# Patient Record
Sex: FEMALE | Race: WHITE | NOT HISPANIC OR LATINO | URBAN - METROPOLITAN AREA
[De-identification: names, ages, dates, MRNs, and addresses within clinical notes are randomized per-mention and may not be internally consistent; named-entity substitution may affect disease eponyms.]

---

## 2021-02-06 ENCOUNTER — EMERGENCY (EMERGENCY)
Facility: HOSPITAL | Age: 31
LOS: 0 days | Discharge: HOME | End: 2021-02-06
Attending: STUDENT IN AN ORGANIZED HEALTH CARE EDUCATION/TRAINING PROGRAM | Admitting: STUDENT IN AN ORGANIZED HEALTH CARE EDUCATION/TRAINING PROGRAM
Payer: OTHER MISCELLANEOUS

## 2021-02-06 VITALS
OXYGEN SATURATION: 100 % | DIASTOLIC BLOOD PRESSURE: 84 MMHG | RESPIRATION RATE: 18 BRPM | SYSTOLIC BLOOD PRESSURE: 133 MMHG | HEART RATE: 84 BPM | TEMPERATURE: 98 F
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
## 2021-02-06 DIAGNOSIS — M25.579 PAIN IN UNSPECIFIED ANKLE AND JOINTS OF UNSPECIFIED FOOT: ICD-10-CM
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
## 2021-02-06 DIAGNOSIS — S82.401A UNSPECIFIED FRACTURE OF SHAFT OF RIGHT FIBULA, INITIAL ENCOUNTER FOR CLOSED FRACTURE: ICD-10-CM

## 2021-02-06 PROCEDURE — 73590 X-RAY EXAM OF LOWER LEG: CPT | Mod: 26,RT

## 2021-02-06 PROCEDURE — 29515 APPLICATION SHORT LEG SPLINT: CPT

## 2021-02-06 PROCEDURE — 99284 EMERGENCY DEPT VISIT MOD MDM: CPT | Mod: 25

## 2021-02-06 PROCEDURE — 73610 X-RAY EXAM OF ANKLE: CPT | Mod: 26,RT

## 2021-02-06 RX ORDER — ACETAMINOPHEN 500 MG
975 TABLET ORAL ONCE
Refills: 0 | Status: COMPLETED | OUTPATIENT
Start: 2021-02-06 | End: 2021-02-06

## 2021-02-06 RX ORDER — IBUPROFEN 200 MG
600 TABLET ORAL ONCE
Refills: 0 | Status: COMPLETED | OUTPATIENT
Start: 2021-02-06 | End: 2021-02-06

## 2021-02-06 RX ADMIN — Medication 600 MILLIGRAM(S): at 08:44

## 2021-02-06 RX ADMIN — Medication 975 MILLIGRAM(S): at 08:44

## 2021-02-06 NOTE — ED PROVIDER NOTE - CLINICAL SUMMARY MEDICAL DECISION MAKING FREE TEXT BOX
Pt w/ ankle pain. + distal fibular frx on xray. Splint applied. crutches given. Discussed all available results.  Pt understands results, plan of care, outpt follow up w/ ortho as discussed, and signs and symptoms for ED return.  Pt  is comfortable with discharge. dc home.

## 2021-02-06 NOTE — ED ADULT TRIAGE NOTE - PATIENT ON (OXYGEN DELIVERY METHOD)
room air
[facility]  Podiatry  .  [place]   Phone: [phone]  Fax:   Follow Up Time:

## 2021-02-06 NOTE — ED PROVIDER NOTE - NS ED ROS FT
Constitutional:  No fever  ENMT: No neck pain or stiffness  Cardiac:  No chest pain  Respiratory:  No cough or respiratory distress.   GI:  No abdominal pain.  MS:  See HPI  Neuro:  No headache   Skin:  No skin rash  Except as documented in the HPI,  all other systems are negative

## 2021-02-06 NOTE — ED PROVIDER NOTE - PATIENT PORTAL LINK FT
You can access the FollowMyHealth Patient Portal offered by [facility] by registering at the following website: [email or web address]. By joining [employer]’s FollowMyHealth portal, you will also be able to view your health information using other applications (apps) compatible with our system.

## 2021-02-06 NOTE — ED ADULT NURSE NOTE - OBJECTIVE STATEMENT
patient alert and oriented x4. patient states she fell at work today and twisted ankle. difficulty baring weight on RLE. visible swelling in right ankle. pt able to move foot and toes, + pedal pulses b/l.

## 2021-02-06 NOTE — ED PROVIDER NOTE - CARE PROVIDER_API CALL
Reji Fields (MD)  Orthopaedic Surgery  [address] [place] [address]  Phone: [phone]  Fax: [phone]  Follow Up Time:

## 2021-02-06 NOTE — ED PROVIDER NOTE - WR ORDER STATUS 1
Telephoned results of lab work and advised to restart Atorvastatin 20 mg (half dose) daily.  Patient repeated back instructions  And advised to keep scheduled appointment in August for repeat labs.   Performed Resulted

## 2021-02-06 NOTE — ED PROVIDER NOTE - OBJECTIVE STATEMENT
29 y/o F no sig pmh p/w acute onset, non radiating, sharp right ankle pain s/p accidental inversion injury when stepping down 1 step today. + difficulty walking. No weakness or numbness

## 2021-02-06 NOTE — ED PROVIDER NOTE - NSFOLLOWUPCLINICS_GEN_ALL_ED_FT
[facility]  Orthpedic  [address], [place]   Phone: [phone]  Fax:   Follow Up Time:

## 2021-02-06 NOTE — ED ADULT NURSE NOTE - NSIMPLEMENTINTERV_GEN_ALL_ED
Implemented All Fall Risk Interventions:  [place] to call system. Call bell, personal items and telephone within reach. Instruct patient to call for assistance. Room bathroom lighting operational. Non-slip footwear when patient is off stretcher. Physically safe environment: no spills, clutter or unnecessary equipment. Stretcher in lowest position, wheels locked, appropriate side rails in place. Provide visual cue, wrist band, yellow gown, etc. Monitor gait and stability. Monitor for mental status changes and reorient to person, place, and time. Review medications for side effects contributing to fall risk. Reinforce activity limits and safety measures with patient and family.

## 2021-02-06 NOTE — ED PROVIDER NOTE - PHYSICAL EXAMINATION
CONSTITUTIONAL: NAD  SKIN: Warm dry  HEAD: NCAT  EYES: NL inspection  ENT: MMM  NECK: Supple  RESP: No resp distress  EXT: R ankle + swelling and ttp posterior Lat mal; no erythema; Med mal; prox fib; 5th metatarsal and rest of foot non ttp; pedal pulse 2+ b/l  NEURO: NL sensation and motor  PSYCH: Cooperative, appropriate.

## 2021-02-10 ENCOUNTER — APPOINTMENT (OUTPATIENT)
Dept: ORTHOPEDIC SURGERY | Facility: CLINIC | Age: 31
End: 2021-02-10
Payer: OTHER MISCELLANEOUS

## 2021-02-10 VITALS — WEIGHT: 185 LBS | RESPIRATION RATE: 16 BRPM | BODY MASS INDEX: 30.82 KG/M2 | HEIGHT: 65 IN

## 2021-02-10 PROCEDURE — 99204 OFFICE O/P NEW MOD 45 MIN: CPT

## 2021-02-10 PROCEDURE — 99072 ADDL SUPL MATRL&STAF TM PHE: CPT

## 2021-02-16 ENCOUNTER — LABORATORY RESULT (OUTPATIENT)
Age: 31
End: 2021-02-16

## 2021-02-17 ENCOUNTER — TRANSCRIPTION ENCOUNTER (OUTPATIENT)
Age: 31
End: 2021-02-17

## 2021-02-18 ENCOUNTER — APPOINTMENT (OUTPATIENT)
Dept: ORTHOPEDIC SURGERY | Facility: AMBULATORY SURGERY CENTER | Age: 31
End: 2021-02-18

## 2021-02-18 ENCOUNTER — OUTPATIENT (OUTPATIENT)
Dept: OUTPATIENT SERVICES | Facility: HOSPITAL | Age: 31
LOS: 1 days | Discharge: HOME | End: 2021-02-18
Payer: OTHER MISCELLANEOUS

## 2021-02-18 PROCEDURE — 29897 ANKLE ARTHROSCOPY/SURGERY: CPT | Mod: RT

## 2021-02-18 PROCEDURE — 27792 TREATMENT OF ANKLE FRACTURE: CPT | Mod: RT

## 2021-02-18 PROCEDURE — 27829 TREAT LOWER LEG JOINT: CPT | Mod: RT

## 2021-02-18 PROCEDURE — 77071 MNL APPL STRS JT RADIOGRAPHY: CPT

## 2021-02-19 ENCOUNTER — NON-APPOINTMENT (OUTPATIENT)
Age: 31
End: 2021-02-19

## 2021-03-03 ENCOUNTER — APPOINTMENT (OUTPATIENT)
Dept: ORTHOPEDIC SURGERY | Facility: CLINIC | Age: 31
End: 2021-03-03
Payer: OTHER MISCELLANEOUS

## 2021-03-03 VITALS — HEIGHT: 65 IN | BODY MASS INDEX: 30.82 KG/M2 | RESPIRATION RATE: 16 BRPM | WEIGHT: 185 LBS

## 2021-03-03 PROCEDURE — 99024 POSTOP FOLLOW-UP VISIT: CPT

## 2021-03-08 ENCOUNTER — RX RENEWAL (OUTPATIENT)
Age: 31
End: 2021-03-08

## 2021-03-31 ENCOUNTER — OUTPATIENT (OUTPATIENT)
Dept: OUTPATIENT SERVICES | Facility: HOSPITAL | Age: 31
LOS: 1 days | End: 2021-03-31
Payer: OTHER MISCELLANEOUS

## 2021-03-31 ENCOUNTER — APPOINTMENT (OUTPATIENT)
Dept: RADIOLOGY | Facility: CLINIC | Age: 31
End: 2021-03-31

## 2021-03-31 ENCOUNTER — APPOINTMENT (OUTPATIENT)
Dept: ORTHOPEDIC SURGERY | Facility: CLINIC | Age: 31
End: 2021-03-31
Payer: OTHER MISCELLANEOUS

## 2021-03-31 ENCOUNTER — RESULT REVIEW (OUTPATIENT)
Age: 31
End: 2021-03-31

## 2021-03-31 VITALS — RESPIRATION RATE: 16 BRPM | WEIGHT: 185 LBS | HEIGHT: 65 IN | BODY MASS INDEX: 30.82 KG/M2

## 2021-03-31 PROCEDURE — 73610 X-RAY EXAM OF ANKLE: CPT

## 2021-03-31 PROCEDURE — 73610 X-RAY EXAM OF ANKLE: CPT | Mod: 26,RT

## 2021-03-31 PROCEDURE — 99024 POSTOP FOLLOW-UP VISIT: CPT

## 2021-03-31 RX ORDER — IBUPROFEN 200 MG
600 CAPSULE ORAL DAILY
Qty: 60 | Refills: 2 | Status: DISCONTINUED | COMMUNITY
Start: 2021-02-15 | End: 2021-03-31

## 2021-03-31 RX ORDER — SPIRONOLACTONE 50 MG/1
TABLET ORAL
Refills: 0 | Status: ACTIVE | COMMUNITY

## 2021-03-31 RX ORDER — GABAPENTIN 300 MG/1
300 CAPSULE ORAL DAILY
Qty: 90 | Refills: 1 | Status: DISCONTINUED | COMMUNITY
Start: 2021-02-10 | End: 2021-03-31

## 2021-03-31 RX ORDER — ACETAMINOPHEN AND CODEINE 300; 30 MG/1; MG/1
300-30 TABLET ORAL
Qty: 40 | Refills: 0 | Status: DISCONTINUED | COMMUNITY
Start: 2021-02-10 | End: 2021-03-31

## 2021-03-31 RX ORDER — ASPIRIN 325 MG/1
325 TABLET, FILM COATED ORAL
Qty: 60 | Refills: 2 | Status: DISCONTINUED | COMMUNITY
Start: 2021-02-15 | End: 2021-03-31

## 2021-03-31 RX ORDER — OXYCODONE 5 MG/1
5 TABLET ORAL
Qty: 40 | Refills: 0 | Status: DISCONTINUED | COMMUNITY
Start: 2021-02-15 | End: 2021-03-31

## 2021-05-12 ENCOUNTER — RESULT REVIEW (OUTPATIENT)
Age: 31
End: 2021-05-12

## 2021-05-12 ENCOUNTER — OUTPATIENT (OUTPATIENT)
Dept: OUTPATIENT SERVICES | Facility: HOSPITAL | Age: 31
LOS: 1 days | End: 2021-05-12
Payer: OTHER MISCELLANEOUS

## 2021-05-12 ENCOUNTER — APPOINTMENT (OUTPATIENT)
Dept: ORTHOPEDIC SURGERY | Facility: CLINIC | Age: 31
End: 2021-05-12
Payer: OTHER MISCELLANEOUS

## 2021-05-12 ENCOUNTER — APPOINTMENT (OUTPATIENT)
Dept: RADIOLOGY | Facility: CLINIC | Age: 31
End: 2021-05-12

## 2021-05-12 VITALS — HEIGHT: 65 IN | RESPIRATION RATE: 16 BRPM | BODY MASS INDEX: 30.82 KG/M2 | WEIGHT: 185 LBS

## 2021-05-12 PROCEDURE — 99213 OFFICE O/P EST LOW 20 MIN: CPT | Mod: 24

## 2021-05-12 PROCEDURE — 73610 X-RAY EXAM OF ANKLE: CPT | Mod: 26,RT

## 2021-05-12 PROCEDURE — 99072 ADDL SUPL MATRL&STAF TM PHE: CPT

## 2021-05-20 ENCOUNTER — NON-APPOINTMENT (OUTPATIENT)
Age: 31
End: 2021-05-20

## 2021-05-20 ENCOUNTER — FORM ENCOUNTER (OUTPATIENT)
Age: 31
End: 2021-05-20

## 2021-05-25 ENCOUNTER — FORM ENCOUNTER (OUTPATIENT)
Age: 31
End: 2021-05-25

## 2021-06-23 ENCOUNTER — RESULT REVIEW (OUTPATIENT)
Age: 31
End: 2021-06-23

## 2021-06-23 ENCOUNTER — OUTPATIENT (OUTPATIENT)
Dept: OUTPATIENT SERVICES | Facility: HOSPITAL | Age: 31
LOS: 1 days | End: 2021-06-23
Payer: OTHER MISCELLANEOUS

## 2021-06-23 ENCOUNTER — APPOINTMENT (OUTPATIENT)
Dept: RADIOLOGY | Facility: CLINIC | Age: 31
End: 2021-06-23

## 2021-06-23 ENCOUNTER — APPOINTMENT (OUTPATIENT)
Dept: ORTHOPEDIC SURGERY | Facility: CLINIC | Age: 31
End: 2021-06-23
Payer: OTHER MISCELLANEOUS

## 2021-06-23 VITALS — RESPIRATION RATE: 16 BRPM | HEIGHT: 65 IN | BODY MASS INDEX: 30.82 KG/M2 | WEIGHT: 185 LBS

## 2021-06-23 DIAGNOSIS — M79.671 PAIN IN RIGHT FOOT: ICD-10-CM

## 2021-06-23 DIAGNOSIS — G57.91 UNSPECIFIED MONONEUROPATHY OF RIGHT LOWER LIMB: ICD-10-CM

## 2021-06-23 PROCEDURE — 99072 ADDL SUPL MATRL&STAF TM PHE: CPT

## 2021-06-23 PROCEDURE — 99214 OFFICE O/P EST MOD 30 MIN: CPT

## 2021-06-23 PROCEDURE — 73610 X-RAY EXAM OF ANKLE: CPT | Mod: 26,RT

## 2021-06-23 RX ORDER — DOCUSATE SODIUM 100 MG/1
100 CAPSULE ORAL TWICE DAILY
Qty: 28 | Refills: 3 | Status: DISCONTINUED | COMMUNITY
Start: 2021-02-15 | End: 2021-06-23

## 2021-08-02 ENCOUNTER — RX RENEWAL (OUTPATIENT)
Age: 31
End: 2021-08-02

## 2021-09-10 ENCOUNTER — APPOINTMENT (OUTPATIENT)
Dept: ORTHOPEDIC SURGERY | Facility: CLINIC | Age: 31
End: 2021-09-10
Payer: OTHER MISCELLANEOUS

## 2021-09-10 PROCEDURE — 99213 OFFICE O/P EST LOW 20 MIN: CPT

## 2021-09-10 PROCEDURE — 73610 X-RAY EXAM OF ANKLE: CPT | Mod: RT

## 2021-09-10 PROCEDURE — 99072 ADDL SUPL MATRL&STAF TM PHE: CPT

## 2021-09-20 VITALS — HEIGHT: 65 IN | WEIGHT: 185 LBS | BODY MASS INDEX: 30.82 KG/M2 | RESPIRATION RATE: 16 BRPM

## 2022-01-12 NOTE — ED PROCEDURE NOTE - PROCEDURE SUPERVISED BY
Christopher Show Applicator Variable?: Yes Consent: The patient's consent was obtained including but not limited to risks of crusting, scabbing, blistering, scarring, darker or lighter pigmentary change, recurrence, incomplete removal and infection. Render Post-Care Instructions In Note?: no Duration Of Freeze Thaw-Cycle (Seconds): 0 Post-Care Instructions: I reviewed with the patient in detail post-care instructions. Patient is to wear sunprotection, and avoid picking at any of the treated lesions. Pt may apply Vaseline to crusted or scabbing areas. Detail Level: Detailed Medical Necessity Information: It is in your best interest to select a reason for this procedure from the list below. All of these items fulfill various CMS LCD requirements except the new and changing color options. Medical Necessity Clause: This procedure was medically necessary because the lesions that were treated were: Spray Paint Text: The liquid nitrogen was applied to the skin utilizing a spray paint frosting technique.

## 2022-01-18 ENCOUNTER — NON-APPOINTMENT (OUTPATIENT)
Age: 32
End: 2022-01-18

## 2022-03-11 ENCOUNTER — APPOINTMENT (OUTPATIENT)
Dept: ORTHOPEDIC SURGERY | Facility: CLINIC | Age: 32
End: 2022-03-11
Payer: OTHER MISCELLANEOUS

## 2022-03-11 VITALS — WEIGHT: 185 LBS | HEIGHT: 65 IN | BODY MASS INDEX: 30.82 KG/M2

## 2022-03-11 DIAGNOSIS — M25.571 PAIN IN RIGHT ANKLE AND JOINTS OF RIGHT FOOT: ICD-10-CM

## 2022-03-11 DIAGNOSIS — S93.431A SPRAIN OF TIBIOFIBULAR LIGAMENT OF RIGHT ANKLE, INITIAL ENCOUNTER: ICD-10-CM

## 2022-03-11 DIAGNOSIS — S82.831A OTHER FRACTURE OF UPPER AND LOWER END OF RIGHT FIBULA, INITIAL ENCOUNTER FOR CLOSED FRACTURE: ICD-10-CM

## 2022-03-11 DIAGNOSIS — M76.821 POSTERIOR TIBIAL TENDINITIS, RIGHT LEG: ICD-10-CM

## 2022-03-11 DIAGNOSIS — M72.2 PLANTAR FASCIAL FIBROMATOSIS: ICD-10-CM

## 2022-03-11 PROCEDURE — 99072 ADDL SUPL MATRL&STAF TM PHE: CPT

## 2022-03-11 PROCEDURE — 99213 OFFICE O/P EST LOW 20 MIN: CPT

## 2022-03-11 PROCEDURE — 73610 X-RAY EXAM OF ANKLE: CPT | Mod: RT

## 2022-03-11 RX ORDER — MELOXICAM 15 MG/1
15 TABLET ORAL
Qty: 90 | Refills: 0 | Status: DISCONTINUED | COMMUNITY
Start: 2021-05-12 | End: 2022-03-11

## 2022-03-11 RX ORDER — CHOLECALCIFEROL (VITAMIN D3) 1250 MCG
1.25 MG CAPSULE ORAL
Qty: 10 | Refills: 0 | Status: DISCONTINUED | COMMUNITY
Start: 2021-02-15 | End: 2022-03-11

## 2022-03-11 RX ORDER — DICLOFENAC SODIUM 1% 10 MG/G
1 GEL TOPICAL
Qty: 1 | Refills: 5 | Status: DISCONTINUED | COMMUNITY
Start: 2021-05-20 | End: 2022-03-11

## 2022-03-18 PROBLEM — M76.821 POSTERIOR TIBIAL TENDINITIS OF RIGHT LOWER EXTREMITY: Status: RESOLVED | Noted: 2021-06-23 | Resolved: 2022-03-18

## 2022-03-18 PROBLEM — M72.2 PLANTAR FASCIITIS OF RIGHT FOOT: Status: RESOLVED | Noted: 2021-05-12 | Resolved: 2022-03-18

## 2022-07-20 ENCOUNTER — APPOINTMENT (OUTPATIENT)
Dept: ORTHOPEDIC SURGERY | Facility: CLINIC | Age: 32
End: 2022-07-20

## 2022-07-20 VITALS — BODY MASS INDEX: 30.82 KG/M2 | WEIGHT: 185 LBS | RESPIRATION RATE: 16 BRPM | HEIGHT: 65 IN

## 2022-07-20 PROCEDURE — 99072 ADDL SUPL MATRL&STAF TM PHE: CPT

## 2022-07-20 PROCEDURE — 99213 OFFICE O/P EST LOW 20 MIN: CPT
